# Patient Record
Sex: FEMALE | Race: ASIAN | NOT HISPANIC OR LATINO | URBAN - METROPOLITAN AREA
[De-identification: names, ages, dates, MRNs, and addresses within clinical notes are randomized per-mention and may not be internally consistent; named-entity substitution may affect disease eponyms.]

---

## 2023-02-02 ENCOUNTER — EMERGENCY (EMERGENCY)
Facility: HOSPITAL | Age: 48
LOS: 1 days | Discharge: HOME | End: 2023-02-02
Attending: EMERGENCY MEDICINE
Payer: MEDICAID

## 2023-02-02 VITALS
TEMPERATURE: 99 F | RESPIRATION RATE: 17 BRPM | SYSTOLIC BLOOD PRESSURE: 149 MMHG | HEART RATE: 94 BPM | OXYGEN SATURATION: 99 % | DIASTOLIC BLOOD PRESSURE: 91 MMHG

## 2023-02-02 VITALS
SYSTOLIC BLOOD PRESSURE: 148 MMHG | OXYGEN SATURATION: 96 % | DIASTOLIC BLOOD PRESSURE: 85 MMHG | RESPIRATION RATE: 16 BRPM | HEART RATE: 89 BPM | TEMPERATURE: 98 F

## 2023-02-02 LAB
ALBUMIN SERPL ELPH-MCNC: 4.6 G/DL — SIGNIFICANT CHANGE UP (ref 3.3–5)
ALP SERPL-CCNC: 57 U/L — SIGNIFICANT CHANGE UP (ref 40–120)
ALT FLD-CCNC: 28 U/L — SIGNIFICANT CHANGE UP (ref 10–45)
ANION GAP SERPL CALC-SCNC: 7 MMOL/L — SIGNIFICANT CHANGE UP (ref 5–17)
APTT BLD: 35.3 SEC — SIGNIFICANT CHANGE UP (ref 27.5–35.5)
AST SERPL-CCNC: 20 U/L — SIGNIFICANT CHANGE UP (ref 10–40)
BILIRUB SERPL-MCNC: 0.4 MG/DL — SIGNIFICANT CHANGE UP (ref 0.2–1.2)
BLD GP AB SCN SERPL QL: NEGATIVE — SIGNIFICANT CHANGE UP
BUN SERPL-MCNC: 9 MG/DL — SIGNIFICANT CHANGE UP (ref 7–23)
CALCIUM SERPL-MCNC: 9.7 MG/DL — SIGNIFICANT CHANGE UP (ref 8.4–10.5)
CHLORIDE SERPL-SCNC: 105 MMOL/L — SIGNIFICANT CHANGE UP (ref 96–108)
CO2 SERPL-SCNC: 30 MMOL/L — SIGNIFICANT CHANGE UP (ref 22–31)
CREAT SERPL-MCNC: 0.52 MG/DL — SIGNIFICANT CHANGE UP (ref 0.5–1.3)
EGFR: 115 ML/MIN/1.73M2 — SIGNIFICANT CHANGE UP
GLUCOSE SERPL-MCNC: 142 MG/DL — HIGH (ref 70–99)
HCG SERPL-ACNC: <2 MIU/ML — SIGNIFICANT CHANGE UP
HCT VFR BLD CALC: 42.7 % — SIGNIFICANT CHANGE UP (ref 34.5–45)
HGB BLD-MCNC: 13.7 G/DL — SIGNIFICANT CHANGE UP (ref 11.5–15.5)
INR BLD: 1 RATIO — SIGNIFICANT CHANGE UP (ref 0.88–1.16)
MCHC RBC-ENTMCNC: 28.1 PG — SIGNIFICANT CHANGE UP (ref 27–34)
MCHC RBC-ENTMCNC: 32.1 GM/DL — SIGNIFICANT CHANGE UP (ref 32–36)
MCV RBC AUTO: 87.7 FL — SIGNIFICANT CHANGE UP (ref 80–100)
NRBC # BLD: 0 /100 WBCS — SIGNIFICANT CHANGE UP (ref 0–0)
PLATELET # BLD AUTO: 309 K/UL — SIGNIFICANT CHANGE UP (ref 150–400)
POTASSIUM SERPL-MCNC: 4.4 MMOL/L — SIGNIFICANT CHANGE UP (ref 3.5–5.3)
POTASSIUM SERPL-SCNC: 4.4 MMOL/L — SIGNIFICANT CHANGE UP (ref 3.5–5.3)
PROT SERPL-MCNC: 7.7 G/DL — SIGNIFICANT CHANGE UP (ref 6–8.3)
PROTHROM AB SERPL-ACNC: 11.5 SEC — SIGNIFICANT CHANGE UP (ref 10.5–13.4)
RBC # BLD: 4.87 M/UL — SIGNIFICANT CHANGE UP (ref 3.8–5.2)
RBC # FLD: 12.7 % — SIGNIFICANT CHANGE UP (ref 10.3–14.5)
RH IG SCN BLD-IMP: POSITIVE — SIGNIFICANT CHANGE UP
SARS-COV-2 RNA SPEC QL NAA+PROBE: SIGNIFICANT CHANGE UP
SODIUM SERPL-SCNC: 142 MMOL/L — SIGNIFICANT CHANGE UP (ref 135–145)
WBC # BLD: 6.28 K/UL — SIGNIFICANT CHANGE UP (ref 3.8–10.5)
WBC # FLD AUTO: 6.28 K/UL — SIGNIFICANT CHANGE UP (ref 3.8–10.5)

## 2023-02-02 PROCEDURE — 99284 EMERGENCY DEPT VISIT MOD MDM: CPT | Mod: 25

## 2023-02-02 PROCEDURE — U0005: CPT

## 2023-02-02 PROCEDURE — 36415 COLL VENOUS BLD VENIPUNCTURE: CPT

## 2023-02-02 PROCEDURE — 86900 BLOOD TYPING SEROLOGIC ABO: CPT

## 2023-02-02 PROCEDURE — 84702 CHORIONIC GONADOTROPIN TEST: CPT

## 2023-02-02 PROCEDURE — 86901 BLOOD TYPING SEROLOGIC RH(D): CPT

## 2023-02-02 PROCEDURE — 76830 TRANSVAGINAL US NON-OB: CPT | Mod: 26

## 2023-02-02 PROCEDURE — 85730 THROMBOPLASTIN TIME PARTIAL: CPT

## 2023-02-02 PROCEDURE — 99284 EMERGENCY DEPT VISIT MOD MDM: CPT

## 2023-02-02 PROCEDURE — 86850 RBC ANTIBODY SCREEN: CPT

## 2023-02-02 PROCEDURE — U0003: CPT

## 2023-02-02 PROCEDURE — 76830 TRANSVAGINAL US NON-OB: CPT

## 2023-02-02 PROCEDURE — 80053 COMPREHEN METABOLIC PANEL: CPT

## 2023-02-02 PROCEDURE — 85610 PROTHROMBIN TIME: CPT

## 2023-02-02 PROCEDURE — 85027 COMPLETE CBC AUTOMATED: CPT

## 2023-02-02 RX ORDER — LIDOCAINE AND PRILOCAINE CREAM 25; 25 MG/G; MG/G
1 CREAM TOPICAL ONCE
Refills: 0 | Status: DISCONTINUED | OUTPATIENT
Start: 2023-02-02 | End: 2023-02-02

## 2023-02-02 RX ORDER — IBUPROFEN 200 MG
600 TABLET ORAL ONCE
Refills: 0 | Status: COMPLETED | OUTPATIENT
Start: 2023-02-02 | End: 2023-02-02

## 2023-02-02 NOTE — ED ADULT NURSE NOTE - OBJECTIVE STATEMENT
49 y/o F presented to the ED c/o vaginal bleeding x4 weeks. Pt reports her menstrual cycle has been lasting "about 25 days" with increased bleeding and clots present, changing pad/tampon every 1.5-2 hrs. Pt endorses mild abdominal cramping on the left side. Denies headaches, weakness, dizziness, N/V, urinary symptoms, sob, cp at this time. Pt followed up with OBGYN and per pt "she had sonograms done that were fine." A&ox3, breathing spontaneously and unlabored, speaking full sentences, moving extremities easily and is ambulatory.

## 2023-02-02 NOTE — ED PROVIDER NOTE - PHYSICAL EXAMINATION
PE:  Gen: NAD  Head: NCAT  ENT: MMM, Normal conjunctiva   Chest: RRR, normal perfusion  Lungs: Symmetrical chest rise, lungs CTAB  Abdomen: soft, NTND, No rebound/guarding  Ext: No gross deformities  Neuro: awake and alert, Moving all extremities equally, ambulatory with steady gait  Skin: no rashes

## 2023-02-02 NOTE — ED PROVIDER NOTE - RAPID ASSESSMENT
James I performed an initial face to face bedside interview with this patient regarding history of present illness and determined that the patient should be evaluated in the main ED. A physical exam was not performed due to private space availability. This patient's evaluation is NOT COMPLETE and only preliminary. The full assessment, management, and reassessment of this patient, including but not limited to the follow up of ordered laboratory and radiologic testing, is deferred to the main ED provider. *** 48 f  menomettoragia x 6 months with 2 days of heavy vag bleeding and llq pain crampy in quality- no fever no n/v/d - no dysuria- multiple pads this am - no cp no lightheadedens - possible- fibroid, vs dysfunctional uterine bbleedin - need to ck hcg and eval for sourvce of pain and bleeding

## 2023-02-02 NOTE — ED PROVIDER NOTE - CLINICAL SUMMARY MEDICAL DECISION MAKING FREE TEXT BOX
Domi Arenas, Attending Physician: 48-year-old female here for persistent vaginal bleeding, acute on chronic.  Differential diagnosis includes but not limited to: Dysfunction uterine bleeding, fibroids, ruptured ovarian cyst, anemia.  It is less likely symptomatic anemia as she is asymptomatic at present.  Labs are obtained in triage and hemoglobin is 13.7 which makes this less consistent with symptomatic anemia or anemia in general.  Labs are otherwise unremarkable.  Pregnancy is negative.  Patient is O+ but does not require transfusion at this time.  No coagulopathy.  No signs or symptoms of thyroid dysfunction to warrant additional testing at this time.  Patient has ultrasound performed as noted.  It is unclear what is causing her dysfunctional uterine bleeding patient was instructed to follow-up with her OB/GYN.

## 2023-02-02 NOTE — ED ADULT NURSE NOTE - CHIEF COMPLAINT QUOTE
1  Continue current seizure medications unchanged  2  Let us know if there are seizures or problems with your medication  3  Return in 6 months to see FELICE Casarez  Return in one year to see Dr Queta Cooney 
vag bleed since jan 7th

## 2023-02-02 NOTE — ED PROVIDER NOTE - NSFOLLOWUPINSTRUCTIONS_ED_ALL_ED_FT
You were seen here for worsening vaginal bleeding. You had an ultrasound which demonstrated a cyst but this is unlikely to be the cause of your symptoms at this time.    Follow up with your OBGYN in 1-2 days. Let them know you were in the Emergency Department for the worsening symptoms.     Seek immediate medical care for symptoms including but not limited to: lightheadedness with worsening bleeding, passing out or nearly passing out, using more than 1 tampon/pad per hour for 2 consecutive hours because it is SATURATED with blood or if you have any new/worsening concerns.     Read all attached.

## 2023-02-02 NOTE — ED PROVIDER NOTE - PATIENT PORTAL LINK FT
You can access the FollowMyHealth Patient Portal offered by [facility] by registering at the following website: [email or web address]. By joining [email or web address]’s FollowMyHealth portal, you will also be able to view your health information using other applications (apps) compatible with our system.

## 2023-02-02 NOTE — ED PROVIDER NOTE - OBJECTIVE STATEMENT
47 yo  female in blue 35R presents to the ER for evaluation of vaginal bleeding. PT awake alert oriented x3 states for the past several months I get my period and bleed for 20-25 days at a time and then stop for a few days and then the bleeding starts again. I do not feel dizzy or lightheaded . I saw my ob gyn two weeks ago and I was told I needed a special sonogram and to go to the ER if the bleeding continues".   Pt denies fevers and chills. PT reports left lower intermittent quadrant pain. for the last week or so.  Denies nausea/vomting/diarreha . 47 yo  female in blue 35R presents to the ER for evaluation of vaginal bleeding. PT awake alert oriented x3 states for the past several months I get my period and bleed for 20-25 days at a time and then stop for a few days and then the bleeding starts again. I do not feel dizzy or lightheaded . I saw my ob gyn two weeks ago and I was told I needed a special sonogram and to go to the ER if the bleeding continues". Pt did have biopsy ~3 months ago without clear cause of symptoms. No chest pain or shortness of breath.  Pt denies fevers and chills. PT reports left lower intermittent quadrant pain. for the last week or so.  Denies nausea/vomting/diarrhea. No unintentional weight loss or weight gain, heat or cold intolerance, hair or skin changes.